# Patient Record
Sex: MALE | Race: WHITE | URBAN - METROPOLITAN AREA
[De-identification: names, ages, dates, MRNs, and addresses within clinical notes are randomized per-mention and may not be internally consistent; named-entity substitution may affect disease eponyms.]

---

## 2017-10-19 ENCOUNTER — OFFICE VISIT (OUTPATIENT)
Dept: DERMATOLOGY | Age: 67
End: 2017-10-19

## 2017-10-19 DIAGNOSIS — L57.0 AK (ACTINIC KERATOSIS): Primary | ICD-10-CM

## 2017-10-19 DIAGNOSIS — D22.9 MULTIPLE NEVI: ICD-10-CM

## 2017-10-19 PROCEDURE — 99202 OFFICE O/P NEW SF 15 MIN: CPT | Performed by: DERMATOLOGY

## 2017-10-19 RX ORDER — FERROUS SULFATE TAB EC 324 MG (65 MG FE EQUIVALENT) 324 (65 FE) MG
TABLET DELAYED RESPONSE ORAL
COMMUNITY
End: 2022-11-01

## 2017-10-19 RX ORDER — ASPIRIN 81 MG/1
81 TABLET ORAL
COMMUNITY

## 2017-10-19 RX ORDER — PANTOPRAZOLE SODIUM 40 MG/1
40 TABLET, DELAYED RELEASE ORAL
COMMUNITY
Start: 2017-01-13

## 2017-10-19 NOTE — PROGRESS NOTES
[facility]  Lizz Almanza MD  [phone]      Landon Padilla  1950    74 y.o. male     Date of Visit: 10/19/2017    Chief Complaint: skin lesions    History of Present Illness:    1. He has persistent scaly lesions on the scalp and handsnone are bothersome. 2.  He also has multiple nevi and a history of likely dysplastic nevidenies any changes in size, color, or shape. Review of Systems:  Skin: no rash. Past Medical History, Family History, Surgical History, Medications and Allergies reviewed. Past Medical History:   Diagnosis Date    Acid reflux     Hyperlipidemia      Past Surgical History:   Procedure Laterality Date    SKIN BIOPSY      TONSILLECTOMY         No Known Allergies  Outpatient Prescriptions Marked as Taking for the 10/19/17 encounter (Office Visit) with Richie Meyer MD   Medication Sig Dispense Refill    aspirin 81 MG EC tablet Take 81 mg by mouth      ferrous sulfate 324 (65 Fe) MG EC tablet Take by mouth      Multiple Vitamin (MULTIVITAMINS PO) Take by mouth      pantoprazole (PROTONIX) 40 MG tablet Take 40 mg by mouth      POTASSIUM PO Take by mouth           Physical Examination       The following were examined and determined to be normal: Psych/Neuro, Conjunctivae/eyelids, Gums/teeth/lips, Neck, Breast/axilla/chest, Abdomen, Back, RLE, LLE and Nails/digits. The following were examined and determined to be abnormal: Scalp/hair, Head/face, RUE and LUE. Well-appearing. 1.  Crown of the scalp, left upper forehead and dorsum of hands with few skin colored to pink scaly macules. 2.  Trunk and extremities with multiple well defined round to oval smooth brown macules and papules. Assessment and Plan     1.  AK (actinic keratosis) - small and asymptomatic    We discussed the relation to chronic cumulative sun exposure and the low premalignant potential.     Counseling regarding sun protective behaviors was performed including sun avoidance, hats and sunscreen. 2. Multiple nevi - benign appearing    He was encouraged to perform monthly self skin exams and to call with any new or concerning lesions. Sun protective behaviors were encouraged. Follow up for full skin exam in 1 year. Return in about 1 year (around 10/19/2018).

## 2018-10-25 ENCOUNTER — OFFICE VISIT (OUTPATIENT)
Dept: DERMATOLOGY | Age: 68
End: 2018-10-25
Payer: MEDICARE

## 2018-10-25 DIAGNOSIS — D22.9 MULTIPLE NEVI: ICD-10-CM

## 2018-10-25 DIAGNOSIS — L82.1 SK (SEBORRHEIC KERATOSIS): Primary | ICD-10-CM

## 2018-10-25 DIAGNOSIS — L57.0 AK (ACTINIC KERATOSIS): ICD-10-CM

## 2018-10-25 PROCEDURE — 99213 OFFICE O/P EST LOW 20 MIN: CPT | Performed by: DERMATOLOGY

## 2018-10-25 PROCEDURE — 17000 DESTRUCT PREMALG LESION: CPT | Performed by: DERMATOLOGY

## 2018-10-25 PROCEDURE — 17003 DESTRUCT PREMALG LES 2-14: CPT | Performed by: DERMATOLOGY

## 2018-10-25 RX ORDER — ATORVASTATIN CALCIUM 20 MG/1
TABLET, FILM COATED ORAL
Refills: 1 | COMMUNITY
Start: 2018-10-11

## 2018-10-25 NOTE — PROGRESS NOTES
[facility]  Héctor Gomes MD  [phone]      Fran Modi  1950    79 y.o. male     Date of Visit: 10/25/2018    Chief Complaint: skin lesions    History of Present Illness:    1. He complains of 2 darker lesions on the forehead and left side of the neck. 2.  Unknown duration of persistent scaly lesions on the scalp and left forearm. 3.  He has multiple nevi-not aware of any changes in size, color, or shape. Review of Systems:  Skin: no rash. Past Medical History, Family History, Surgical History, Medications and Allergies reviewed. Past Medical History:   Diagnosis Date    Acid reflux     Hyperlipidemia      Past Surgical History:   Procedure Laterality Date    SKIN BIOPSY      TONSILLECTOMY         No Known Allergies  Outpatient Prescriptions Marked as Taking for the 10/25/18 encounter (Office Visit) with Jd Stanford MD   Medication Sig Dispense Refill    atorvastatin (LIPITOR) 20 MG tablet TAKE 1 TABLET BY MOUTH EVERY DAY IN THE EVENING  1    aspirin 81 MG EC tablet Take 81 mg by mouth      ferrous sulfate 324 (65 Fe) MG EC tablet Take by mouth      Multiple Vitamin (MULTIVITAMINS PO) Take by mouth      pantoprazole (PROTONIX) 40 MG tablet Take 40 mg by mouth      POTASSIUM PO Take by mouth         Physical Examination       The following were examined and determined to be normal: Psych/Neuro, Head/face, Conjunctivae/eyelids, Gums/teeth/lips, Neck, Breast/axilla/chest, Abdomen, Back, RUE, RLE, LLE and Nails/digits. The following were examined and determined to be abnormal: Scalp/hair and LUE. Well appearing. 1.  Upper forehead and left anterolateral neck with 2 stuck on appearing verrucous dark brown plaques. 2.  Crown of the scalp-3, left forearm-1: Few keratotic erythematous macules. 3.  Trunk and extremities with multiple well defined round to oval smooth brown macules and papules. Assessment and Plan     1.  SK

## 2019-10-24 ENCOUNTER — OFFICE VISIT (OUTPATIENT)
Dept: DERMATOLOGY | Age: 69
End: 2019-10-24
Payer: MEDICARE

## 2019-10-24 DIAGNOSIS — L82.1 SK (SEBORRHEIC KERATOSIS): ICD-10-CM

## 2019-10-24 DIAGNOSIS — D22.9 MULTIPLE NEVI: ICD-10-CM

## 2019-10-24 DIAGNOSIS — L57.0 ACTINIC KERATOSIS: Primary | ICD-10-CM

## 2019-10-24 PROCEDURE — 99213 OFFICE O/P EST LOW 20 MIN: CPT | Performed by: DERMATOLOGY

## 2019-10-24 PROCEDURE — 3017F COLORECTAL CA SCREEN DOC REV: CPT | Performed by: DERMATOLOGY

## 2019-10-24 PROCEDURE — G8421 BMI NOT CALCULATED: HCPCS | Performed by: DERMATOLOGY

## 2019-10-24 PROCEDURE — G8484 FLU IMMUNIZE NO ADMIN: HCPCS | Performed by: DERMATOLOGY

## 2019-10-24 PROCEDURE — 17003 DESTRUCT PREMALG LES 2-14: CPT | Performed by: DERMATOLOGY

## 2019-10-24 PROCEDURE — 4040F PNEUMOC VAC/ADMIN/RCVD: CPT | Performed by: DERMATOLOGY

## 2019-10-24 PROCEDURE — G8427 DOCREV CUR MEDS BY ELIG CLIN: HCPCS | Performed by: DERMATOLOGY

## 2019-10-24 PROCEDURE — 17000 DESTRUCT PREMALG LESION: CPT | Performed by: DERMATOLOGY

## 2019-10-24 PROCEDURE — 1123F ACP DISCUSS/DSCN MKR DOCD: CPT | Performed by: DERMATOLOGY

## 2019-10-24 PROCEDURE — 1036F TOBACCO NON-USER: CPT | Performed by: DERMATOLOGY

## 2019-10-24 RX ORDER — TAMSULOSIN HYDROCHLORIDE 0.4 MG/1
0.4 CAPSULE ORAL
COMMUNITY

## 2020-10-27 ENCOUNTER — OFFICE VISIT (OUTPATIENT)
Dept: DERMATOLOGY | Age: 70
End: 2020-10-27
Payer: MEDICARE

## 2020-10-27 VITALS — TEMPERATURE: 97.5 F

## 2020-10-27 PROCEDURE — G8421 BMI NOT CALCULATED: HCPCS | Performed by: DERMATOLOGY

## 2020-10-27 PROCEDURE — 11102 TANGNTL BX SKIN SINGLE LES: CPT | Performed by: DERMATOLOGY

## 2020-10-27 PROCEDURE — 3017F COLORECTAL CA SCREEN DOC REV: CPT | Performed by: DERMATOLOGY

## 2020-10-27 PROCEDURE — 1123F ACP DISCUSS/DSCN MKR DOCD: CPT | Performed by: DERMATOLOGY

## 2020-10-27 PROCEDURE — 1036F TOBACCO NON-USER: CPT | Performed by: DERMATOLOGY

## 2020-10-27 PROCEDURE — G8427 DOCREV CUR MEDS BY ELIG CLIN: HCPCS | Performed by: DERMATOLOGY

## 2020-10-27 PROCEDURE — 4040F PNEUMOC VAC/ADMIN/RCVD: CPT | Performed by: DERMATOLOGY

## 2020-10-27 PROCEDURE — G8484 FLU IMMUNIZE NO ADMIN: HCPCS | Performed by: DERMATOLOGY

## 2020-10-27 PROCEDURE — 99213 OFFICE O/P EST LOW 20 MIN: CPT | Performed by: DERMATOLOGY

## 2020-10-27 NOTE — PROGRESS NOTES
[facility]  Riya Hoover MD  [phone]      Alcon Gay  1950    78 y.o. male     Date of Visit: 10/27/2020    Chief Complaint: skin moles    History of Present Illness:    1. Aks, scalp    nevi      Review of Systems:  Gen: Feels well, good sense of health. Skin: No new or changing moles, no history of keloids or hypertrophic scars. Heme: No abnormal bruising or bleeding. Past Medical History, Family History, Surgical History, Medications and Allergies reviewed. Past Medical History:   Diagnosis Date    Acid reflux     Hyperlipidemia      Past Surgical History:   Procedure Laterality Date    SKIN BIOPSY      TONSILLECTOMY         No Known Allergies  Outpatient Medications Marked as Taking for the 10/27/20 encounter (Office Visit) with Rolan Jacobsen MD   Medication Sig Dispense Refill    Fexofenadine HCl (ALLEGRA PO) Take by mouth      tamsulosin (FLOMAX) 0.4 MG capsule Take 0.4 mg by mouth      atorvastatin (LIPITOR) 20 MG tablet TAKE 1 TABLET BY MOUTH EVERY DAY IN THE EVENING  1    aspirin 81 MG EC tablet Take 81 mg by mouth      ferrous sulfate 324 (65 Fe) MG EC tablet Take by mouth      Multiple Vitamin (MULTIVITAMINS PO) Take by mouth      pantoprazole (PROTONIX) 40 MG tablet Take 40 mg by mouth      POTASSIUM PO Take by mouth         Social History:  Occupation:  ***      Physical Examination       The following were examined and determined to be normal: {DERMATOLOGY [id number]}. The following were examined and determined to be abnormal: {DERMATOLOGY [id number]}. ***        Assessment and Plan     No diagnosis found. No follow-ups on file.

## 2020-10-27 NOTE — PATIENT INSTRUCTIONS

## 2020-10-27 NOTE — PROGRESS NOTES
[facility]  Jacobo Frances MD  [phone]      Slade Hiss  1950    78 y.o. male     Date of Visit: 10/27/2020    Chief Complaint: skin lesions    History of Present Illness:    1. He presents today for a full skin examination. He reports multiple nevi on the trunk and extremities-not aware of any changes in size, color, or shape. 2.  He also reports a newly noted lesion on the right parietal scalp. 3.  He has multiple unchanging pigmented lesions on the upper extremities. 4.  Unknown duration of an asymptomatic lesion on the left upper forehead. Review of Systems:  Skin: No rash or other concerning skin lesions. Past Medical History, Family History, Surgical History, Medications and Allergies reviewed. Past Medical History:   Diagnosis Date    Acid reflux     Hyperlipidemia      Past Surgical History:   Procedure Laterality Date    SKIN BIOPSY      TONSILLECTOMY         No Known Allergies  Outpatient Medications Marked as Taking for the 10/27/20 encounter (Office Visit) with Alyssa Seay MD   Medication Sig Dispense Refill    Fexofenadine HCl (ALLEGRA PO) Take by mouth      tamsulosin (FLOMAX) 0.4 MG capsule Take 0.4 mg by mouth      atorvastatin (LIPITOR) 20 MG tablet TAKE 1 TABLET BY MOUTH EVERY DAY IN THE EVENING  1    aspirin 81 MG EC tablet Take 81 mg by mouth      ferrous sulfate 324 (65 Fe) MG EC tablet Take by mouth      Multiple Vitamin (MULTIVITAMINS PO) Take by mouth      pantoprazole (PROTONIX) 40 MG tablet Take 40 mg by mouth      POTASSIUM PO Take by mouth         Physical Examination       The following were examined and determined to be normal: Psych/Neuro, Scalp/hair, Conjunctivae/eyelids, Gums/teeth/lips, Neck, Breast/axilla/chest, Abdomen, Back, RUE, LUE, RLE, LLE and Nails/digits. The following were examined and determined to be abnormal: Head/face. Well-appearing.     1.  Scattered on the trunk and lower extremities are multiple well-defined round oval uniformly brown macules and few papules. 2.  Right parietal scalp with a stuck on appearing round verrucous brown plaque. 3.  Extensor surfaces of the hands and upper extremities with multiple round smooth brown macules and patches. 4.  Left upper forehead with a 4 mm pearly telangiectatic papule. Assessment and Plan     1. Multiple nevi - benign appearing    Sun protective behaviors and self skin examinations were encouraged. Call for any new or concerning lesions. 2. SK (seborrheic keratosis)    Reassurance. 3. Solar lentigines    Monitor for change. 4. Neoplasm of uncertain behavior of skin, left upper forehead - r/o BCC    Discussed possible diagnosis; patient agreeable to biopsy (verbal consent obtained). The area(s) to be biopsied were marked with a surgical pen. Alcohol was used to cleanse the site. Local anesthesia was acheived with 1% lidocaine with epinephrine. Shave biopsy was performed using a razor blade. Hemostasis was achieved with aluminum chloride. The wound(s) were dressed with petrolatum and covered with a bandage. Wound care instructions were reviewed. 1 Specimen (s) sent to pathology. The specimen bottles were appropriately labeled. We also reviewed the risks of bleeding, scar, and infection. Return in about 1 year (around 10/27/2021).

## 2020-10-29 LAB — DERMATOLOGY PATHOLOGY REPORT: ABNORMAL

## 2021-01-05 ENCOUNTER — PROCEDURE VISIT (OUTPATIENT)
Dept: SURGERY | Age: 71
End: 2021-01-05
Payer: MEDICARE

## 2021-01-05 VITALS — TEMPERATURE: 97.2 F | DIASTOLIC BLOOD PRESSURE: 81 MMHG | HEART RATE: 90 BPM | SYSTOLIC BLOOD PRESSURE: 121 MMHG

## 2021-01-05 DIAGNOSIS — C44.319 BASAL CELL CARCINOMA OF LEFT FOREHEAD: Primary | ICD-10-CM

## 2021-01-05 PROCEDURE — 17311 MOHS 1 STAGE H/N/HF/G: CPT | Performed by: DERMATOLOGY

## 2021-01-05 PROCEDURE — 13132 CMPLX RPR F/C/C/M/N/AX/G/H/F: CPT | Performed by: DERMATOLOGY

## 2021-01-05 NOTE — PATIENT INSTRUCTIONS
[facility] Office Hours:    Monday-Thursday  7:30 AM-4:30 PM    Friday  9:00 AM-1:00 PM    POST-OPERATIVE CARE FOR DISSOLVING STICHES  Bandage change after 48 hours    During your procedure today, dissolving sutures, or stiches, were used to close the wound. You do not have to have stiches removed. They will dissolve (melt away) on their own. Please follow these instructions to help you recover from your procedure and help your wound heal.    CARING FOR YOUR SURGICAL SITE  The bandage should remain on and completely dry for 48 hours. Do NOT get the bandage wet. 1. After the first 48 hours, gently remove the remaining part of the bandage. It can be helpful to moisten the bandage edges in the shower. Steri strips may still be on the wound. It is ok, they will fall off slowly with the daily bandage changes. 2. Gently clean AROUND the wound daily with mild soap and water. Please avoid the area from getting wet. The more moisture is on the sutures the quicker they will dissolve. 3. Dry (pat) the area with a clean Q-tip or gauze. Try to clean off any debris or crust from the area. 4. Apply a layer of Vaseline/ Aquaphor (or Bacitracin if your doctor recommends) to the wound area only. 5. Cut a piece of Telfa (or any non-stick dressing) to fit just over the wound and secure it with paper tape. If the wound is small you may use a Band- Aid. Keep area covered for a total of 1 week(s). If the dressing comes off or if you have questions, or concerns about the dressing, please call the office for instructions! POST OPERATIVE INSTRUCTIONS    1. Activity: Do not lift anything heavier than a gallon of milk for 1 week. Also, avoid strenuous activity such as running, power walking or contact sports. 2. Eating and drinking: Do not drink alcohol for 48 hours after your procedure. Alcohol increases the chances of bleeding.   3. Medicines   -If you have discomfort, take Acetaminophen (Tylenol or Extra Strength Tylenol). Follow the instructions and warning on the bottle. -If your doctor has prescribed you an Aspirin daily, please keep taking it. Do not take extra Aspirin or medicines containing Aspirin (such as [place] and Excedrin) for 48 hours after your procedure. Bleeding: If bleeding occurs, DO NOT remove the bandage. Put firm pressure on the area with gauze for 20 minutes without peeking. If the bleeding continues, apply pressure for another 20 minutes. If the bleeding does not stop after you apply pressure, call us right away. If you can not call, go to the nearest emergency room or urgent care facility. What to expect:  You may have these symptoms. They are normal and should get better with time:  1. Swelling. Swelling usually increases for the first 48 hours after your procedure and then begins to improve. Some soreness and redness around your wound. If we worked close to your eyes (forehead, nose, temple, or upper cheeks) your eyes may become swollen and/ or black and blue. 2. Bruising, which could last 1 week or more. 3. Pink and bumpy appearance to the scar. This may happen a few weeks after your procedure. After 4 weeks, you may gently massage the area each day with facial moisturizer or petroleum jelly (Vaseline or Aquaphor). This will help to smooth the skin and improve the appearance of the scar. The color of your scar will fade over time, but it may be pink for several months after the procedure. The scar may take 6 months to 1 year to reach its final color and appearance. 4. \"Spitting\" suture. Occasionally, an inside suture (stitch) does not completely dissolve. When this happens, (generally 4-8 weeks after surgery), it causes a bump or \"pimple\" to form on the scar. This is easily removed and is not at all serious. It does not mean the skin cancer has returned. Contact us if it happens, but do not be alarmed. Vitamin E oil is NOT necessary.  A good moisturizer is just as effective. Sunscreen IS necessary. Use at least and SPF 30 sunscreen daily- even in winter    Call us at [phone] right away if you have any of the following symptoms:  -Bleeding that you can not stop (see highlighted area above). -Pain that lasts longer than 48 hours.  -Your wound becomes more painful, red or hot. -Bruising and swelling that does not begin to improve within the 48 hours or gets worse suddenly.

## 2021-01-05 NOTE — PROGRESS NOTES
PRE-PROCEDURE SCREENING    Pacemaker/ICD: No  Difficulty with numbing in the past: No  Local Anesthesia Reaction/passing out: No  Latex or adhesive allergy:  No  Bleeding/Clotting Disorders: No  Anticoagulant Therapy: Yes - Aspirin 81 mg daily  Joint prosthesis: No  Artificial Heart Valve: No  Stroke or Seizures: No  Organ Transplant or Lymphoma: No  Immunosuppression: No  Respiratory Problems: No

## 2021-01-05 NOTE — PROGRESS NOTES
MOHS PROCEDURE NOTE    PHYSICIAN:  Abril Vizcaino. Oliva Bledsoe MD    ASSISTANT: Armen Arita RN and Jorge Castillo RN     REFERRING PROVIDER:  Emperatriz Lam MD    PREOPERATIVE DIAGNOSIS: Nodular Basal Cell Carcinoma     SPECIFIC MOHS INDICATIONS:  location    AUC SCORIN/9    POSTOPERATIVE DIAGNOSIS: SAME    LOCATION: Left upper forehead    OPERATIVE PROCEDURE:  MOHS MICROGRAPHIC SURGERY    RECONSTRUCTION OF DEFECT: Complex layered closure    PREOPERATIVE SIZE: 9x7 MM    DEFECT SIZE: 14x12 MM    LENGTH OF REPAIRED WOUND/SIZE OF FLAP/SIZE OF GRAFT:  36 MM    ANESTHESIA: 6 mL 1% lidocaine with epinephrine 1:100,000 buffered. EBL:  MINIMAL    DURATION OF PROCEDURE:  1.5 HOURS    POSTOPERATIVE OBSERVATION: 0.75 HOUR    SPECIMENS:  SEE MOHS MAP    COMPLICATIONS:  NONE    DESCRIPTION OF PROCEDURE:  The patient was given a mirror, as appropriate, and the biopsy site was identified, marked with a surgical marking pen, and verified by the patient. Options for treatment were discussed and the patient was informed that Mohs surgery was the selected treatment based on its lower recurrence rate, given the features listed above, as compared to other treatment modalities such as excision, radiation, or curettage, and agreed with this treatment plan. Risks and benefits including bruising, swelling, bleeding, infection, nerve injury, recurrence, and scarring were discussed with the patient prior to the procedure and a written consent detailing these and other risks was reviewed with the patient and signed. There was a time out for person and procedure verification. The surgical site was prepped with an antiseptic solution. Application of an antiseptic solution was repeated before each surgical stage. Stage I:  The clinically-apparent tumor was carefully defined and debulked, determining the edge of the surgical excision.     A thin layer of tumor-laden tissue was excised with a narrow margin of normal-appearing skin, using the technique of Mohs. A map was prepared to correspond to the area of skin from which it was excised. Hemostasis was achieved using electrosurgery. The wound was bandaged. The tissue was prepared for the cryostat and sectioned. 1 section(s) prepared. Each section was coded, cut, and stained for microscopic examination. The entire base and margins of the excised piece of tissue were examined by the surgeon. The tissue was examined to the level of  subcut fat. No tumor was identified at the peripheral margins of stage I of microscopically controlled surgery. DEFECT MANAGEMENT:    REPAIR DESCRIPTION:  Various closure modalities were discussed with the patient, and it was decided that a complex repair would best preserve normal anatomic and functional relationships. Additional risk of wound dehiscence was discussed. This is a complex closure based on: Undermining    Extensive undermining was performed: the distance of undermining was 12mm, which is equal to or greater than the maximum width of the defect, measured perpendicular to the closure line along at least one entire edge of the defect. The patient was placed on the procedure room table. The area was anesthetized with 1% lidocaine with epinephrine 1:100,000 buffered, was given a sterile prep using Chlorhexidine gluconate 4% solution, and draped in the usual sterile fashion. Recreation and enlargement of the wound was performed by excising cones of tissue via the triangulation technique. The final incision lines were placed with respect for the patient's natural skin tension lines in a linear configuration to avoid functional and aesthetic distortion of adjacent free margins. Following undermining, meticulous hemostasis was obtained with spot monopolar electrocoagulation.   Subcutaneous dead space and dermis were closed using 4-0/5-0 Vicryl buried subcutaneous interrupted suture and the epidermis was approximated with 5-0 Fast absorbing gut using running epidermal sutures. WOUND COVERAGE:  The wound was cleaned with normal saline solution, dried off, Aquaphor ointment was applied, and the wound was covered. A dressing was applied for stabilization and light pressure. The patient was given detailed oral and written instructions on postoperative care. There were no complications. The patient left the Unit in good medical condition. FOLLOW-UP:  As dissolving sutures were placed, the patient was asked to return if any questions or concerns arose, but otherwise will return to see general dermatology per their instructions.

## 2021-01-06 ENCOUNTER — TELEPHONE (OUTPATIENT)
Dept: SURGERY | Age: 71
End: 2021-01-06

## 2021-01-06 NOTE — TELEPHONE ENCOUNTER
The patient was in the office on 1/5/2021 for Mohs located on the LT upper forehead with CLC repair. The patient tolerated the procedure well and left the office in good condition. Pain level on post-operative day 1:  Dull Headaches - level PT advised that he did take Tylenol and this helped the pain a great deal.     Any bleeding episode that required pressure to be held, bandage change or a call to the office or MD?  no     Any other issues?:  no    A post-operative telephone call was placed at 3:08p, 1/6/2021, in order to check on the patient's recovery process. The patient reported doing well and had no complaints other than those listed above, if any. All of the patient's questions were answered.

## 2021-10-27 ENCOUNTER — OFFICE VISIT (OUTPATIENT)
Dept: DERMATOLOGY | Age: 71
End: 2021-10-27
Payer: MEDICARE

## 2021-10-27 VITALS — TEMPERATURE: 97.5 F

## 2021-10-27 DIAGNOSIS — D48.5 NEOPLASM OF UNCERTAIN BEHAVIOR OF SKIN OF EYELID: ICD-10-CM

## 2021-10-27 DIAGNOSIS — L57.0 ACTINIC KERATOSIS: ICD-10-CM

## 2021-10-27 DIAGNOSIS — D22.9 MULTIPLE NEVI: Primary | ICD-10-CM

## 2021-10-27 PROCEDURE — G8427 DOCREV CUR MEDS BY ELIG CLIN: HCPCS | Performed by: DERMATOLOGY

## 2021-10-27 PROCEDURE — 4040F PNEUMOC VAC/ADMIN/RCVD: CPT | Performed by: DERMATOLOGY

## 2021-10-27 PROCEDURE — G8484 FLU IMMUNIZE NO ADMIN: HCPCS | Performed by: DERMATOLOGY

## 2021-10-27 PROCEDURE — 1123F ACP DISCUSS/DSCN MKR DOCD: CPT | Performed by: DERMATOLOGY

## 2021-10-27 PROCEDURE — 99213 OFFICE O/P EST LOW 20 MIN: CPT | Performed by: DERMATOLOGY

## 2021-10-27 PROCEDURE — 3017F COLORECTAL CA SCREEN DOC REV: CPT | Performed by: DERMATOLOGY

## 2021-10-27 PROCEDURE — G8421 BMI NOT CALCULATED: HCPCS | Performed by: DERMATOLOGY

## 2021-10-27 PROCEDURE — 17000 DESTRUCT PREMALG LESION: CPT | Performed by: DERMATOLOGY

## 2021-10-27 PROCEDURE — 17003 DESTRUCT PREMALG LES 2-14: CPT | Performed by: DERMATOLOGY

## 2021-10-27 PROCEDURE — 1036F TOBACCO NON-USER: CPT | Performed by: DERMATOLOGY

## 2021-11-10 ENCOUNTER — PROCEDURE VISIT (OUTPATIENT)
Dept: SURGERY | Age: 71
End: 2021-11-10
Payer: MEDICARE

## 2021-11-10 VITALS — TEMPERATURE: 98.2 F

## 2021-11-10 DIAGNOSIS — D48.5 NEOPLASM OF UNCERTAIN BEHAVIOR OF SKIN: Primary | ICD-10-CM

## 2021-11-10 PROCEDURE — 67810 INCAL BX EYELID SKN LID MRGN: CPT | Performed by: DERMATOLOGY

## 2021-11-11 NOTE — PROGRESS NOTES
Neoplasm of uncertain behavior:  R/O sk vs BCC  Location: right lower eyelid at the [facility]  - Discussed possible diagnosis. Patient agreeable to biopsy. Verbal and written consent obtained after risks (infection, bleeding, scar), benefits and alternatives explained. - The area to be biopsied was marked with a surgical marking pen and a verbal time-out was performed. - Local anesthesia was achieved with 1% lidocaine with epinephrine/sodium bicarbonate. - The area was cleansed with alcohol and a biopsy was performed using curved gradle scissors. Hemostasis was achieved with aluminum chloride. A small amount of petroleum jelly was applied to the wound and a band-aid applied. - Specimen placed in a correctly identified specimen container. - 1 specimen(s) sent to pathology. There were no immediate complications and the patient left the office in good condition.  -  Patient educated re: risk of bleeding, infection, scar and wound care instructions reviewed. The patient will be informed of biopsy results by phone or letter as soon as available.

## 2021-11-15 ENCOUNTER — TELEPHONE (OUTPATIENT)
Dept: SURGERY | Age: 71
End: 2021-11-15

## 2021-11-15 NOTE — TELEPHONE ENCOUNTER
I reviewed results of the biopsy with the patient. Date of biopsy: 11/10/21  Site of biopsy: Right lower lid  Result: Nodular basal cell carcinoma    Plan: Mohs with oculoplastics repair. Please refer for cei consultation or pt preference of oculoplastics if not cei    The patient expressed understanding of the plan.

## 2021-11-15 NOTE — TELEPHONE ENCOUNTER
----- Message from Vivina Young MD sent at 11/15/2021  8:09 AM EST -----  Bcc right lower lid - needs mohs with oculoplastics repair.  Please refer for cei consultation or pt preference of oculoplastics if not cei

## 2022-01-25 ENCOUNTER — TELEPHONE (OUTPATIENT)
Dept: SURGERY | Age: 72
End: 2022-01-25

## 2022-01-25 NOTE — TELEPHONE ENCOUNTER
Pt was called to cancel appt on 1/27/22 due to Dr. Shahla Coleman being out the rest of this week. I emailed Dr. Emily Nickerson office as well, cancelling and needing to reschedule the repair.

## 2022-03-10 ENCOUNTER — PROCEDURE VISIT (OUTPATIENT)
Dept: SURGERY | Age: 72
End: 2022-03-10
Payer: MEDICARE

## 2022-03-10 VITALS — SYSTOLIC BLOOD PRESSURE: 134 MMHG | HEART RATE: 91 BPM | DIASTOLIC BLOOD PRESSURE: 68 MMHG

## 2022-03-10 DIAGNOSIS — C44.1122 BASAL CELL CARCINOMA OF RIGHT LOWER EYELID: Primary | ICD-10-CM

## 2022-03-10 PROCEDURE — 17312 MOHS ADDL STAGE: CPT | Performed by: DERMATOLOGY

## 2022-03-10 PROCEDURE — 17311 MOHS 1 STAGE H/N/HF/G: CPT | Performed by: DERMATOLOGY

## 2022-03-10 NOTE — PROGRESS NOTES
MOHS PROCEDURE NOTE    PHYSICIAN:  Shaheen Araujo. Cathleen Ferrer MD, Who operated in two distinct and integrated capacities as the surgeon removing the tissue and as the pathologist examining the tissue. ASSISTANT: Berenice Dennis RN and Ilana Leiva RN     REFERRING PROVIDER:   Darryle Kennel, MD    PREOPERATIVE DIAGNOSIS: Nodular Basal Cell Carcinoma     SPECIFIC MOHS INDICATIONS:  location and need for tissue conservation    AUC SCORIN/9    POSTOPERATIVE DIAGNOSIS: SAME    LOCATION: Right lower lid    OPERATIVE PROCEDURE:  MOHS MICROGRAPHIC SURGERY    RECONSTRUCTION OF DEFECT: Olive Louder to perform repair as arranged    PREOPERATIVE SIZE: 6x4 MM    DEFECT SIZE: 8x5 MM    LENGTH OF REPAIRED WOUND/SIZE OF FLAP/SIZE OF GRAFT:  n/a MM    ANESTHESIA:  3mL 1% lidocaine with epinephrine 1:100,000 buffered. EBL:  MINIMAL    DURATION OF PROCEDURE:  1 HOURS    POSTOPERATIVE OBSERVATION: 45 MIN     SPECIMENS:  SEE MOHS MAP    COMPLICATIONS:  NONE    DESCRIPTION OF PROCEDURE:  The patient was given a mirror, as appropriate, and the biopsy site was identified, marked with a surgical marking pen, and verified by the patient. Options for treatment were discussed and the patient was informed that Mohs surgery was the selected treatment based on its lower recurrence rate, given the features listed above, as compared to other treatment modalities such as excision, radiation, or curettage, and agreed with this treatment plan. Risks and benefits including bruising, swelling, bleeding, infection, nerve injury, recurrence, and scarring were discussed with the patient prior to the procedure and a written consent detailing these and other risks was reviewed with the patient and signed. There was a time out for person and procedure verification. The surgical site was prepped with an antiseptic solution. Application of an antiseptic solution was repeated before each surgical stage.       Stage I:  The clinically-apparent tumor was carefully defined and debulked, determining the edge of the surgical excision. A thin layer of tumor-laden tissue was excised with a narrow margin of normal-appearing skin, using the technique of Mohs. A map was prepared to correspond to the area of skin from which it was excised. Hemostasis was achieved using electrosurgery. The wound was bandaged. The tissue was prepared for the cryostat and sectioned. 1 section(s) prepared. Each section was coded, cut, and stained for microscopic examination. The entire base and margins of the excised piece of tissue were examined by the surgeon. The tissue was examined to the level of subcutaneous fat. Stage I:  Nodular BCC: large basaloid lobules of varying shape and size with peripheral palisading present around the rim of the lobule, with retraction of the tumor lobules from their associated stroma. The remaining tumor was noted and the next stage was performed. Stage II:  A thin layer of tissue was removed at the histologically-identified sites of remaining tumor. The entire procedure as described in stage I was repeated to process the tissue according to Mohs technique. 1 section(s) prepared for stage II. No tumor was identified at the peripheral margins of stage II of microscopically controlled surgery. DEFECT MANAGEMENT:    REPAIR DESCRIPTION:    As had been arranged, the patient will be seen by Dr. Aixa Ross, for repair. WOUND COVERAGE:  The wound was cleaned with normal saline solution, dried off, Aquaphor ointment was applied, and the wound was covered. A dressing was applied for stabilization and light pressure. The patient was given detailed oral and written instructions on postoperative care. There were no complications. The patient left the Unit in good medical condition. FOLLOW-UP:  F/u with Dr. Guillermina Hdz.

## 2022-03-11 ENCOUNTER — TELEPHONE (OUTPATIENT)
Dept: SURGERY | Age: 72
End: 2022-03-11

## 2022-11-01 ENCOUNTER — OFFICE VISIT (OUTPATIENT)
Dept: DERMATOLOGY | Age: 72
End: 2022-11-01
Payer: MEDICARE

## 2022-11-01 DIAGNOSIS — L57.0 ACTINIC KERATOSIS: ICD-10-CM

## 2022-11-01 DIAGNOSIS — Z85.828 HISTORY OF BASAL CELL CARCINOMA: ICD-10-CM

## 2022-11-01 DIAGNOSIS — D22.9 MULTIPLE NEVI: Primary | ICD-10-CM

## 2022-11-01 DIAGNOSIS — L82.1 SK (SEBORRHEIC KERATOSIS): ICD-10-CM

## 2022-11-01 PROCEDURE — 17003 DESTRUCT PREMALG LES 2-14: CPT | Performed by: DERMATOLOGY

## 2022-11-01 PROCEDURE — 1036F TOBACCO NON-USER: CPT | Performed by: DERMATOLOGY

## 2022-11-01 PROCEDURE — 17000 DESTRUCT PREMALG LESION: CPT | Performed by: DERMATOLOGY

## 2022-11-01 PROCEDURE — G8421 BMI NOT CALCULATED: HCPCS | Performed by: DERMATOLOGY

## 2022-11-01 PROCEDURE — G8484 FLU IMMUNIZE NO ADMIN: HCPCS | Performed by: DERMATOLOGY

## 2022-11-01 PROCEDURE — 1123F ACP DISCUSS/DSCN MKR DOCD: CPT | Performed by: DERMATOLOGY

## 2022-11-01 PROCEDURE — 3017F COLORECTAL CA SCREEN DOC REV: CPT | Performed by: DERMATOLOGY

## 2022-11-01 PROCEDURE — 99213 OFFICE O/P EST LOW 20 MIN: CPT | Performed by: DERMATOLOGY

## 2022-11-01 PROCEDURE — G8427 DOCREV CUR MEDS BY ELIG CLIN: HCPCS | Performed by: DERMATOLOGY

## 2022-11-01 RX ORDER — ASCORBIC ACID 1000 MG
TABLET ORAL
COMMUNITY

## 2022-11-01 NOTE — PROGRESS NOTES
[facility] Dermatology  Klaus Veliz MD  [phone]      Nathan Joy  1950    81 y.o. male     Date of Visit: 11/1/2022    Chief Complaint: skin lesions    History of Present Illness:    1. He presents today for evaluation of multiple moles on the trunk and extremities-not aware of any concerning changes. 2.  He also reports a persistent lesion on the right upper chest.  He also reports a few growths on the scalp. 3.  Follow-up for history of actinic keratoses-has few new lesions on the scalp and hands today. 4.  He has a history of BCCs-denies any signs of recurrence. Nodular BCC of the right lower lid-treated with Mohs by Dr. Lukas Taylor on 3/10/2022; had plastics repair with Dr. Parul Drake. Nodular BCC of the left upper forehead-treated with Mohs by Dr. Lukas Taylor on 1/5/2021. Review of Systems:  Gen: Feels well, good sense of health. Past Medical History, Family History, Surgical History, Medications and Allergies reviewed.     Past Medical History:   Diagnosis Date    Acid reflux     Cancer ([facility] [address]     NBCC - LT upper forehead    Hyperlipidemia      Past Surgical History:   Procedure Laterality Date    MOHS SURGERY      SKIN BIOPSY      TONSILLECTOMY         No Known Allergies  Outpatient Medications Marked as Taking for the 11/1/22 encounter (Office Visit) with Jose Leija MD   Medication Sig Dispense Refill    Ginkgo Biloba 40 MG TABS Take by mouth      tamsulosin (FLOMAX) 0.4 MG capsule Take 0.4 mg by mouth      atorvastatin (LIPITOR) 20 MG tablet TAKE 1 TABLET BY MOUTH EVERY DAY IN THE EVENING  1    aspirin 81 MG EC tablet Take 81 mg by mouth      Multiple Vitamin (MULTIVITAMINS PO) Take by mouth      pantoprazole (PROTONIX) 40 MG tablet Take 40 mg by mouth             Physical Examination       The following were examined and determined to be normal: Psych/Neuro, Conjunctivae/eyelids, Gums/teeth/lips, Neck, Breast/axilla/chest, Abdomen, Back, RLE, LLE, and Nails/digits. The following were examined and determined to be abnormal: Scalp/hair, Head/face, RUE, and LUE. Well appearing. 1.  Trunk and extremities with multiple well defined round to oval smooth brown macules and papules. 2.  Temporal portions of the scalp with few stuck on appearing yellowish papules. Right clavicular region with a stuck on appearing oval-shaped verrucous tan papule. 3.  Crown of the scalp - 3, right frontal scalp - 1, right hand - 1, left hand - 1: ill defined keratotic pink macules. 4.  Clear. Assessment and Plan     1. Multiple nevi - benign appearing    Sun protective behaviors, including use of at least SPF 30 sunscreen, and self skin examinations were encouraged. Call for any new or concerning lesions. 2. SK (seborrheic keratosis)     Reassurance. 3. Actinic keratosis - several    Cryotherapy was discussed and patient agreed to proceed. Consent was obtained. 6 lesions were treated cryotherapy: Crown of the scalp - 3, right frontal scalp - 1, right hand - 1, left hand - 1. 2 cycles of liquid nitrogen applied to each lesion for 5 seconds using a Cry-Ac cryo spray gun. Patient was educated regarding the potential risks of blister formation and discomfort. Wound care was discussed. The patient tolerated the procedure well and there were no immediate complications. 4. History of basal cell carcinomas - clear    Sun protective behaviors, including use of at least SPF 30 sunscreen, and self skin examinations were encouraged. Call for any new or concerning lesions. Return in about 6 months (around 5/1/2023).     --Duane Quest, MD

## 2023-03-24 ENCOUNTER — APPOINTMENT (OUTPATIENT)
Dept: GENERAL RADIOLOGY | Age: 73
End: 2023-03-24
Payer: MEDICARE

## 2023-03-24 ENCOUNTER — HOSPITAL ENCOUNTER (EMERGENCY)
Age: 73
Discharge: HOME | End: 2023-03-24
Attending: EMERGENCY MEDICINE
Payer: MEDICARE

## 2023-03-24 VITALS
TEMPERATURE: 98.4 F | HEART RATE: 83 BPM | OXYGEN SATURATION: 97 % | BODY MASS INDEX: 31.84 KG/M2 | HEIGHT: 69 IN | WEIGHT: 215 LBS | RESPIRATION RATE: 21 BRPM | SYSTOLIC BLOOD PRESSURE: 110 MMHG | DIASTOLIC BLOOD PRESSURE: 74 MMHG

## 2023-03-24 DIAGNOSIS — R07.9 CHEST PAIN, UNSPECIFIED TYPE: Primary | ICD-10-CM

## 2023-03-24 LAB
ALBUMIN SERPL-MCNC: 4.4 G/DL (ref 3.4–5)
ALBUMIN/GLOB SERPL: 1.9 {RATIO} (ref 1.1–2.2)
ALP SERPL-CCNC: 73 U/L (ref 40–129)
ALT SERPL-CCNC: 20 U/L (ref 10–40)
ANION GAP SERPL CALCULATED.3IONS-SCNC: 11 MMOL/L (ref 3–16)
AST SERPL-CCNC: 16 U/L (ref 15–37)
BILIRUB SERPL-MCNC: 0.4 MG/DL (ref 0–1)
BUN SERPL-MCNC: 15 MG/DL (ref 7–20)
CALCIUM SERPL-MCNC: 9.4 MG/DL (ref 8.3–10.6)
CHLORIDE SERPL-SCNC: 106 MMOL/L (ref 99–110)
CO2 SERPL-SCNC: 24 MMOL/L (ref 21–32)
CREAT SERPL-MCNC: 0.8 MG/DL (ref 0.8–1.3)
DEPRECATED RDW RBC AUTO: 13.6 % (ref 12.4–15.4)
EKG ATRIAL RATE: 89 BPM
EKG DIAGNOSIS: NORMAL
EKG P AXIS: 53 DEGREES
EKG P-R INTERVAL: 192 MS
EKG Q-T INTERVAL: 348 MS
EKG QRS DURATION: 94 MS
EKG QTC CALCULATION (BAZETT): 423 MS
EKG R AXIS: 24 DEGREES
EKG T AXIS: 59 DEGREES
EKG VENTRICULAR RATE: 89 BPM
GFR SERPLBLD CREATININE-BSD FMLA CKD-EPI: >60 ML/MIN/{1.73_M2}
GLUCOSE SERPL-MCNC: 127 MG/DL (ref 70–99)
HCT VFR BLD AUTO: 39.1 % (ref 40.5–52.5)
HGB BLD-MCNC: 13.2 G/DL (ref 13.5–17.5)
MCH RBC QN AUTO: 31.1 PG (ref 26–34)
MCHC RBC AUTO-ENTMCNC: 33.8 G/DL (ref 31–36)
MCV RBC AUTO: 92.2 FL (ref 80–100)
PLATELET # BLD AUTO: 217 K/UL (ref 135–450)
PMV BLD AUTO: 7.6 FL (ref 5–10.5)
POTASSIUM SERPL-SCNC: 4 MMOL/L (ref 3.5–5.1)
PROT SERPL-MCNC: 6.7 G/DL (ref 6.4–8.2)
RBC # BLD AUTO: 4.24 M/UL (ref 4.2–5.9)
SODIUM SERPL-SCNC: 141 MMOL/L (ref 136–145)
TROPONIN T SERPL-MCNC: <0.01 NG/ML
TROPONIN T SERPL-MCNC: <0.01 NG/ML
WBC # BLD AUTO: 6.6 K/UL (ref 4–11)

## 2023-03-24 PROCEDURE — 36415 COLL VENOUS BLD VENIPUNCTURE: CPT

## 2023-03-24 PROCEDURE — 80053 COMPREHEN METABOLIC PANEL: CPT

## 2023-03-24 PROCEDURE — 93005 ELECTROCARDIOGRAM TRACING: CPT | Performed by: EMERGENCY MEDICINE

## 2023-03-24 PROCEDURE — 71046 X-RAY EXAM CHEST 2 VIEWS: CPT

## 2023-03-24 PROCEDURE — 84484 ASSAY OF TROPONIN QUANT: CPT

## 2023-03-24 PROCEDURE — 85027 COMPLETE CBC AUTOMATED: CPT

## 2023-03-24 PROCEDURE — 99285 EMERGENCY DEPT VISIT HI MDM: CPT

## 2023-03-24 RX ORDER — MORPHINE SULFATE 4 MG/ML
4 INJECTION, SOLUTION INTRAMUSCULAR; INTRAVENOUS
Status: DISCONTINUED | OUTPATIENT
Start: 2023-03-24 | End: 2023-03-24 | Stop reason: HOSPADM

## 2023-03-24 ASSESSMENT — HEART SCORE: ECG: 0

## 2023-03-24 ASSESSMENT — ENCOUNTER SYMPTOMS
ABDOMINAL PAIN: 0
DIARRHEA: 0
COUGH: 0
EYE PAIN: 0
NAUSEA: 0
VOMITING: 0
SHORTNESS OF BREATH: 0
WHEEZING: 0

## 2023-03-24 NOTE — ED PROVIDER NOTES
Albumin 4.4 3.4 - 5.0 g/dL    Albumin/Globulin Ratio 1.9 1.1 - 2.2    Total Bilirubin 0.4 0.0 - 1.0 mg/dL    Alkaline Phosphatase 73 40 - 129 U/L    ALT 20 10 - 40 U/L    AST 16 15 - 37 U/L   Troponin   Result Value Ref Range    Troponin <0.01 <0.01 ng/mL   Troponin   Result Value Ref Range    Troponin <0.01 <0.01 ng/mL   EKG 12 Lead   Result Value Ref Range    Ventricular Rate 89 BPM    Atrial Rate 89 BPM    P-R Interval 192 ms    QRS Duration 94 ms    Q-T Interval 348 ms    QTc Calculation (Bazett) 423 ms    P Axis 53 degrees    R Axis 24 degrees    T Axis 59 degrees    Diagnosis       EKG performed in ER and to be interpreted by ER physician. Confirmed by MD, ER ([id number]),  Lizbeth Goel ([id number]) on 3/24/2023 5:54:02 AM     EKG     Interpreted by me    Rhythm: normal sinus   Rate: normal  Axis: normal  Ectopy: none  Conduction: normal  ST Segments: No ischemic changes  T Waves: No ischemic changes  Q Waves: none    Clinical Impression: no acute changes and normal EKG    ED BEDSIDE ULTRASOUND:  No results found. MOST RECENT VITALS:  BP: 110/74,Temp: 98.4 °F (36.9 °C), Heart Rate: 83, Resp: 21, SpO2: 97 %     Procedures         ED Course     Nursing Notes, Past Medical Hx, Past Surgical Hx, Social Hx,Allergies, and Family Hx were reviewed. The patient was given the following medications:  Orders Placed This Encounter   Medications    morphine injection 4 mg       CONSULTS:  None    Review of Systems     Review of Systems   Constitutional:  Negative for chills and fever. HENT:  Negative for congestion. Eyes:  Negative for pain. Respiratory:  Negative for cough, shortness of breath and wheezing. Cardiovascular:  Positive for chest pain. Negative for leg swelling. Gastrointestinal:  Negative for abdominal pain, diarrhea, nausea and vomiting. Genitourinary:  Negative for dysuria. Musculoskeletal:  Negative for arthralgias. Skin:  Negative for rash.    Neurological:  Negative for weakness and alert and oriented to person, place, and time.                   Evangelist Ahuja MD  03/24/23 [id number]

## 2023-05-02 ENCOUNTER — OFFICE VISIT (OUTPATIENT)
Dept: DERMATOLOGY | Age: 73
End: 2023-05-02
Payer: MEDICARE

## 2023-05-02 DIAGNOSIS — L82.1 SK (SEBORRHEIC KERATOSIS): ICD-10-CM

## 2023-05-02 DIAGNOSIS — Z85.828 HISTORY OF BASAL CELL CARCINOMA: ICD-10-CM

## 2023-05-02 DIAGNOSIS — L57.0 ACTINIC KERATOSIS: ICD-10-CM

## 2023-05-02 DIAGNOSIS — L81.4 SOLAR LENTIGO: ICD-10-CM

## 2023-05-02 DIAGNOSIS — D22.9 MULTIPLE NEVI: Primary | ICD-10-CM

## 2023-05-02 PROCEDURE — G8427 DOCREV CUR MEDS BY ELIG CLIN: HCPCS | Performed by: DERMATOLOGY

## 2023-05-02 PROCEDURE — 3017F COLORECTAL CA SCREEN DOC REV: CPT | Performed by: DERMATOLOGY

## 2023-05-02 PROCEDURE — 1036F TOBACCO NON-USER: CPT | Performed by: DERMATOLOGY

## 2023-05-02 PROCEDURE — 1123F ACP DISCUSS/DSCN MKR DOCD: CPT | Performed by: DERMATOLOGY

## 2023-05-02 PROCEDURE — G8417 CALC BMI ABV UP PARAM F/U: HCPCS | Performed by: DERMATOLOGY

## 2023-05-02 PROCEDURE — 99213 OFFICE O/P EST LOW 20 MIN: CPT | Performed by: DERMATOLOGY

## 2023-05-02 NOTE — PROGRESS NOTES
[facility] Dermatology  Hernesto Crespo MD  [phone]      Caty Factor  1950    72 y.o. male     Date of Visit: 5/2/2023    Chief Complaint: skin lesions    History of Present Illness:    1. He presents today for evaluation of multiple moles - not aware of any changes in size, color or shape. 2.  He also presents for a couple of asymptomatic growths on the scalp. 3.  He has a history of AKs - has a couple of new asymptomatic lesions on the right ear and hands. 4.  He has stable freckling on the scalp and upper extremities. 5.  He has a history of BCCs - denies any signs of recurrence. Derm Hx:    Nodular BCC of the right lower lid-treated with Mohs by Dr. Kiran Singh on 3/10/2022; had plastics repair with Dr. Gautam Duran. Nodular BCC of the left upper forehead-treated with Mohs by Dr. Kiran Singh on 1/5/2021. Review of Systems:  Gen: Feels well, good sense of health. Past Medical History, Family History, Surgical History, Medications and Allergies reviewed.     Past Medical History:   Diagnosis Date    Acid reflux     Cancer ([facility] [address]     NBCC - LT upper forehead    Hyperlipidemia      Past Surgical History:   Procedure Laterality Date    MOHS SURGERY      PROSTATE BIOPSY      SKIN BIOPSY      TONSILLECTOMY         No Known Allergies  Outpatient Medications Marked as Taking for the 5/2/23 encounter (Office Visit) with Nam Ortiz MD   Medication Sig Dispense Refill    Ginkgo Biloba 40 MG TABS Take by mouth      tamsulosin (FLOMAX) 0.4 MG capsule Take 1 capsule by mouth      atorvastatin (LIPITOR) 20 MG tablet TAKE 1 TABLET BY MOUTH EVERY DAY IN THE EVENING  1    aspirin 81 MG EC tablet Take 1 tablet by mouth      Multiple Vitamin (MULTIVITAMINS PO) Take by mouth      pantoprazole (PROTONIX) 40 MG tablet Take 1 tablet by mouth           Physical Examination       The following were examined and determined to be normal: Psych/Neuro, Scalp/hair, Conjunctivae/eyelids,

## 2024-04-16 ENCOUNTER — OFFICE VISIT (OUTPATIENT)
Dept: DERMATOLOGY | Age: 74
End: 2024-04-16
Payer: MEDICARE
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
## 2024-04-16 DIAGNOSIS — D22.9 MULTIPLE NEVI: Primary | ICD-10-CM

## 2024-04-16 DIAGNOSIS — S30.860A TICK BITE OF BACK, INITIAL ENCOUNTER: ICD-10-CM

## 2024-04-16 DIAGNOSIS — L57.0 ACTINIC KERATOSIS: ICD-10-CM

## 2024-04-16 DIAGNOSIS — Z85.828 HISTORY OF BASAL CELL CARCINOMA: ICD-10-CM

## 2024-04-16 PROCEDURE — G8421 BMI NOT CALCULATED: HCPCS | Performed by: DERMATOLOGY

## 2024-04-16 PROCEDURE — G8427 DOCREV CUR MEDS BY ELIG CLIN: HCPCS | Performed by: DERMATOLOGY

## 2024-04-16 PROCEDURE — 1123F ACP DISCUSS/DSCN MKR DOCD: CPT | Performed by: DERMATOLOGY

## 2024-04-16 PROCEDURE — 17000 DESTRUCT PREMALG LESION: CPT | Performed by: DERMATOLOGY

## 2024-04-16 PROCEDURE — 17003 DESTRUCT PREMALG LES 2-14: CPT | Performed by: DERMATOLOGY

## 2024-04-16 PROCEDURE — 3017F COLORECTAL CA SCREEN DOC REV: CPT | Performed by: DERMATOLOGY

## 2024-04-16 PROCEDURE — 1036F TOBACCO NON-USER: CPT | Performed by: DERMATOLOGY

## 2024-04-16 PROCEDURE — 99213 OFFICE O/P EST LOW 20 MIN: CPT | Performed by: DERMATOLOGY

## 2024-04-16 NOTE — PROGRESS NOTES
[facility] Dermatology  Rafa Taylor MD  [phone]      Samy Cha  1950    74 y.o. male     Date of Visit: 4/16/2024    Chief Complaint: follow up for AKs and BCCs    History of Present Illness:    1.  He presents today for evaluation of multiple moles - not aware of any changes in size, color or shape.       2.  He reports few persistent scaly lesions on the scalp.     3.  He reports a 5 day history of an itchy area on the back.     4.  He has a history of BCCs -     Nodular BCC of the right lower lid-treated with Mohs by Dr. Moran on 3/10/2022; had plastics repair with Dr. Jhaveri.  Nodular BCC of the left upper forehead-treated with Mohs by Dr. Moran on 1/5/2021.      Review of Systems:  Gen: Feels well, good sense of health.    Past Medical History, Family History, Surgical History, Medications and Allergies reviewed.    Past Medical History:   Diagnosis Date    Acid reflux     Cancer ([facility]CC)     NBCC - LT upper forehead    Hyperlipidemia      Past Surgical History:   Procedure Laterality Date    MOHS SURGERY      PROSTATE BIOPSY      SKIN BIOPSY      TONSILLECTOMY         No Known Allergies  Outpatient Medications Marked as Taking for the 4/16/24 encounter (Office Visit) with Rafa Taylor MD   Medication Sig Dispense Refill    Ginkgo Biloba 40 MG TABS Take by mouth      tamsulosin (FLOMAX) 0.4 MG capsule Take 1 capsule by mouth      atorvastatin (LIPITOR) 20 MG tablet TAKE 1 TABLET BY MOUTH EVERY DAY IN THE EVENING  1    aspirin 81 MG EC tablet Take 1 tablet by mouth      Multiple Vitamin (MULTIVITAMINS PO) Take by mouth      pantoprazole (PROTONIX) 40 MG tablet Take 1 tablet by mouth         Physical Examination       The following were examined and determined to be normal: Psych/Neuro, Head/face, Conjunctivae/eyelids, Gums/teeth/lips, Neck, Breast/axilla/chest, Abdomen, Back, RUE, LUE, RLE, LLE, and Nails/digits.    The following were examined and determined to be abnormal:

## 2024-06-15 ENCOUNTER — HOSPITAL ENCOUNTER (EMERGENCY)
Age: 74
Discharge: HOME | End: 2024-06-15
Attending: STUDENT IN AN ORGANIZED HEALTH CARE EDUCATION/TRAINING PROGRAM
Payer: MEDICARE

## 2024-06-15 VITALS
RESPIRATION RATE: 17 BRPM | OXYGEN SATURATION: 97 % | DIASTOLIC BLOOD PRESSURE: 76 MMHG | HEART RATE: 98 BPM | TEMPERATURE: 98.5 F | SYSTOLIC BLOOD PRESSURE: 159 MMHG

## 2024-06-15 DIAGNOSIS — S61.412A LACERATION OF LEFT HAND WITHOUT FOREIGN BODY, INITIAL ENCOUNTER: Primary | ICD-10-CM

## 2024-06-15 PROCEDURE — 99284 EMERGENCY DEPT VISIT MOD MDM: CPT

## 2024-06-15 PROCEDURE — 90471 IMMUNIZATION ADMIN: CPT

## 2024-06-15 PROCEDURE — 6360000002 HC RX W HCPCS

## 2024-06-15 PROCEDURE — 12001 RPR S/N/AX/GEN/TRNK 2.5CM/<: CPT

## 2024-06-15 PROCEDURE — 90715 TDAP VACCINE 7 YRS/> IM: CPT

## 2024-06-15 RX ADMIN — TETANUS TOXOID, REDUCED DIPHTHERIA TOXOID AND ACELLULAR PERTUSSIS VACCINE, ADSORBED 0.5 ML: 5; 2.5; 8; 8; 2.5 SUSPENSION INTRAMUSCULAR at 12:18

## 2024-06-15 ASSESSMENT — PAIN - FUNCTIONAL ASSESSMENT: PAIN_FUNCTIONAL_ASSESSMENT: 0-10

## 2024-06-15 ASSESSMENT — PAIN SCALES - GENERAL: PAINLEVEL_OUTOF10: 3

## 2024-06-15 NOTE — ED PROVIDER NOTES
[facility]  EMERGENCY DEPARTMENT ENCOUNTER          EM RESIDENT NOTE       Date of evaluation: 6/15/2024    Chief Complaint     Finger Laceration (Finger was caught in hedgetrimmer. Bleeding controlled in triage. )      History of Present Illness     Samy Cha is a 74 y.o. male with no significant pmh who presents for a finger laceration. Per pt, he was using a  about 45 min PTA when it slipped and he sustained a laceration to his left middle finger. Pt is not on any blood thinners. Bleeding stopped with pressure prior to arrival to the hospital.         MEDICAL DECISION MAKING / ASSESSMENT / PLAN     INITIAL VITALS: BP: (!) 159/76, Temp: 98.5 °F (36.9 °C), Pulse: 98, Respirations: 17, SpO2: 97 %    Samy Cha is a 74 y.o. male with small, 2 cm laceration to middle finger. No concerns for arterial or tendon injury on exam. Laceration dermbonded and wrapped. Pt given Tdap in the ED. Discussed return precautions, pt stable for discharge home.     Is this patient to be included in the SEP-1 core measure? No Exclusion criteria - the patient is NOT to be included for SEP-1 Core Measure due to: Infection is not suspected    Medical Decision Making  Risk  Prescription drug management.        This patient was also evaluated by the attending physician. All care plans werediscussed and agreed upon.    Clinical Impression     1. Laceration of left hand without foreign body, initial encounter        Disposition     PATIENT REFERRED TO:  Meryl Cook MD  [address] [place] [address] [phone]    Schedule an appointment as soon as possible for a visit in 1 week  As needed      DISCHARGE MEDICATIONS:  Discharge Medication List as of 6/15/2024 12:16 PM          DISPOSITION Decision To Discharge 06/15/2024 12:22:22 PM        Diagnostic Results and Other Data     RADIOLOGY:  No orders to display       LABS:   No results found for this visit on 06/15/24.    RECENT VITALS:

## 2024-06-15 NOTE — ED PROVIDER NOTES
ED Attending Attestation Note     Date of evaluation: 6/15/2024    I have discussed the case with the resident. I have personally performed a history, physical exam, and my own medical decision making. I have reviewed the note and agree with the findings and plan. My assessment reveals a well-appearing 74-year-old male who cut his left middle finger while using electric hedge tremor.  His tetanus was updated, the wound was irrigated and repaired.  I was present for all critical pieces of wound repair.  Please see procedure note for further details.  Patient discharged home with strict return precautions and instruction to follow-up with his PCP for wound recheck within the next week.         Scottie Gates MD  06/15/24 [id number]

## 2025-04-16 ENCOUNTER — OFFICE VISIT (OUTPATIENT)
Age: 75
End: 2025-04-16

## 2025-04-16 DIAGNOSIS — D22.9 MULTIPLE NEVI: ICD-10-CM

## 2025-04-16 DIAGNOSIS — L57.0 ACTINIC KERATOSIS: ICD-10-CM

## 2025-04-16 DIAGNOSIS — L82.1 SK (SEBORRHEIC KERATOSIS): Primary | ICD-10-CM

## 2025-04-16 DIAGNOSIS — Z85.828 HISTORY OF BASAL CELL CARCINOMA: ICD-10-CM

## 2025-04-16 NOTE — PROGRESS NOTES
[facility] Dermatology  Rafa Taylor MD  [phone]      Samy Cha  1950    75 y.o. male     Date of Visit: 4/16/2025    Chief Complaint: skin lesions of concern    History of Present Illness:    1.  He reports a growth on the top of the scalp.  Recently traumatized.     2.  He also presents today for evaluation of multiple moles - not aware of any changes in size, color or shape.       3.  He has a persistent scaly lesion on the scalp.     4.  He has a history of BCCs:     Nodular BCC of the right lower lid-treated with Mohs by Dr. Moran on 3/10/2022; had plastics repair with Dr. Jhaveri.  Nodular BCC of the left upper forehead-treated with Mohs by Dr. Moran on 1/5/2021.      Review of Systems:  Gen: Feels well, good sense of health.    Past Medical History, Family History, Surgical History, Medications and Allergies reviewed.    Past Medical History:   Diagnosis Date    Acid reflux     Cancer (HCC)     NBCC - LT upper forehead    Hyperlipidemia      Past Surgical History:   Procedure Laterality Date    MOHS SURGERY      PROSTATE BIOPSY      SKIN BIOPSY      TONSILLECTOMY         No Known Allergies  Outpatient Medications Marked as Taking for the 4/16/25 encounter (Office Visit) with Rafa Taylor MD   Medication Sig Dispense Refill    Ginkgo Biloba 40 MG TABS Take by mouth      tamsulosin (FLOMAX) 0.4 MG capsule Take 1 capsule by mouth      atorvastatin (LIPITOR) 20 MG tablet TAKE 1 TABLET BY MOUTH EVERY DAY IN THE EVENING  1    aspirin 81 MG EC tablet Take 1 tablet by mouth      Multiple Vitamin (MULTIVITAMINS PO) Take by mouth      pantoprazole (PROTONIX) 40 MG tablet Take 1 tablet by mouth         Physical Examination       Well appearing.     1.  Crown and superior parietal scalp with several stuck on appearing verrucous tan white papules.     2.  Trunk and extremities with multiple well defined round to oval smooth brown macules and papules.     3.  Superior parietal scalp with